# Patient Record
Sex: FEMALE | Race: WHITE | NOT HISPANIC OR LATINO | Employment: OTHER | URBAN - METROPOLITAN AREA
[De-identification: names, ages, dates, MRNs, and addresses within clinical notes are randomized per-mention and may not be internally consistent; named-entity substitution may affect disease eponyms.]

---

## 2017-01-17 ENCOUNTER — OFFICE VISIT (OUTPATIENT)
Dept: URGENT CARE | Facility: CLINIC | Age: 42
End: 2017-01-17
Payer: COMMERCIAL

## 2017-01-17 VITALS
DIASTOLIC BLOOD PRESSURE: 64 MMHG | TEMPERATURE: 98.4 F | SYSTOLIC BLOOD PRESSURE: 118 MMHG | BODY MASS INDEX: 23.99 KG/M2 | HEART RATE: 60 BPM | HEIGHT: 65 IN | OXYGEN SATURATION: 95 % | WEIGHT: 144 LBS | RESPIRATION RATE: 12 BRPM

## 2017-01-17 DIAGNOSIS — J02.0 STREP THROAT: ICD-10-CM

## 2017-01-17 LAB
INT CON NEG: NEGATIVE
INT CON POS: POSITIVE
S PYO AG THROAT QL: POSITIVE

## 2017-01-17 PROCEDURE — 99204 OFFICE O/P NEW MOD 45 MIN: CPT | Performed by: FAMILY MEDICINE

## 2017-01-17 PROCEDURE — 87880 STREP A ASSAY W/OPTIC: CPT | Performed by: FAMILY MEDICINE

## 2017-01-17 RX ORDER — AMOXICILLIN AND CLAVULANATE POTASSIUM 875; 125 MG/1; MG/1
1 TABLET, FILM COATED ORAL 2 TIMES DAILY
Qty: 20 TAB | Refills: 0 | Status: SHIPPED | OUTPATIENT
Start: 2017-01-17 | End: 2017-01-27

## 2017-01-17 RX ORDER — HYDROCODONE BITARTRATE AND ACETAMINOPHEN 5; 325 MG/1; MG/1
1 TABLET ORAL EVERY 8 HOURS PRN
Qty: 3 TAB | Refills: 0 | Status: SHIPPED | OUTPATIENT
Start: 2017-01-17 | End: 2021-12-02

## 2017-01-17 NOTE — Clinical Note
January 17, 2017         Patient: Chacha Aguilera   YOB: 1975   Date of Visit: 1/17/2017           To Whom it May Concern:    Chacha Aguilera was seen in my clinic on 1/17/2017. She may return to work on thursday.    If you have any questions or concerns, please don't hesitate to call.        Sincerely,           Robert Rodriguez M.D.  Electronically Signed

## 2017-01-17 NOTE — MR AVS SNAPSHOT
"        Chacha Aguilera   2017 3:15 PM   Office Visit   MRN: [id number]    Department:  [facility]   Dept Phone:  [phone]    Description:  Female : 1975   Provider:  Robert Rodriguez M.D.           Reason for Visit     Pharyngitis daughter had strep about a week ago       Allergies as of 2017     No Known Allergies      You were diagnosed with     Strep throat   [228998]         Vital Signs     Blood Pressure Pulse Temperature Respirations Height Weight    118/64 mmHg 60 36.9 °C (98.4 °F) 12 1.651 m (5' 5\") 65.318 kg (144 lb)    Body Mass Index Oxygen Saturation Breastfeeding? Smoking Status          23.96 kg/m2 95% No Never Smoker         Basic Information     Date Of Birth Sex Race Ethnicity Preferred Language    1975 Female White Non- English      Problem List              ICD-10-CM Priority Class Noted - Resolved    Migraine    2010 - Present      Health Maintenance        Date Due Completion Dates    IMM DTaP/Tdap/Td Vaccine (1 - Tdap) 1995 ---    PAP SMEAR 1997 ---    MAMMOGRAM 2016 ---    IMM INFLUENZA (1) 2016 ---            Results     POCT Rapid Strep A      Component    Rapid Strep Screen    positive    Internal Control Positive    Positive    Internal Control Negative    Negative                        Current Immunizations     No immunizations on file.      Below and/or attached are the medications your provider expects you to take. Review all of your home medications and newly ordered medications with your provider and/or pharmacist. Follow medication instructions as directed by your provider and/or pharmacist. Please keep your medication list with you and share with your provider. Update the information when medications are discontinued, doses are changed, or new medications (including over-the-counter products) are added; and carry medication information at all times in the event of emergency situations     Allergies:  No Known " Allergies          Medications  Valid as of: January 17, 2017 -  4:40 PM    Generic Name Brand Name Tablet Size Instructions for use    ALPRAZolam (Tab) XANAX 0.25 MG Take 1 Tab by mouth 3 times a day.        Amoxicillin-Pot Clavulanate (Tab) AUGMENTIN 875-125 MG Take 1 Tab by mouth 2 times a day for 10 days.        DiazePAM (Tab) VALIUM 5 MG Take 1 Tab by mouth every 6 hours as needed for Anxiety.        Estradiol Valerate   1.5 mg. Tuesday and Friday         Frovatriptan Succinate (Tab) Frovatriptan Succinate 2.5 MG Take 1 Tab by mouth. 1 tab, repeat in 2 hr if needed        Hydrocodone-Acetaminophen (Tab) NORCO 5-325 MG Take 1 Tab by mouth every 8 hours as needed.        Ketorolac Tromethamine (Solution) TORADOL 30 MG/ML 2 mL by Intramuscular route 1 time daily as needed.        Nalbuphine HCl (Solution) NUBAIN 10 MG/ML 2 mL by Intramuscular route 1 time daily as needed for 1 dose.        Oxycodone-Acetaminophen (Tab) PERCOCET  MG Take 1-2 Tabs by mouth every four hours as needed for Mild Pain.        Oxycodone-Acetaminophen (Tab) PERCOCET 5-325 MG Take 1-2 Tabs by mouth every 6 hours as needed for Mild Pain. for headache        Progesterone (Suppos) Progesterone 25 MG QHS.         Progesterone   QDAY.         Promethazine HCl (Tab) PHENERGAN 25 MG Take 1 Tab by mouth every 6 hours as needed for Nausea/Vomiting.        SUMAtriptan Succinate   Take  by mouth.        SUMAtriptan Succinate (Tab) IMITREX 50 MG Take 1 Tab by mouth Once PRN for Migraine. may repeat in 2 hrs if needed. for 1 dose.        TraZODone HCl   by Does not apply route.        .                 Medicines prescribed today were sent to:     [facility][id number] - [place], [place] - [address]    [address] [address]    Phone: [phone] Fax: [phone]    Open 24 Hours?: No      Medication refill instructions:       If your prescription bottle indicates you have medication refills left, it is not necessary to call your  provider’s office. Please contact your pharmacy and they will refill your medication.    If your prescription bottle indicates you do not have any refills left, you may request refills at any time through one of the following ways: The online Dragonfly system (except Urgent Care), by calling your provider’s office, or by asking your pharmacy to contact your provider’s office with a refill request. Medication refills are processed only during regular business hours and may not be available until the next business day. Your provider may request additional information or to have a follow-up visit with you prior to refilling your medication.   *Please Note: Medication refills are assigned a new Rx number when refilled electronically. Your pharmacy may indicate that no refills were authorized even though a new prescription for the same medication is available at the pharmacy. Please request the medicine by name with the pharmacy before contacting your provider for a refill.           Dragonfly Access Code: [email or web address]  Expires: 2/16/2017  4:40 PM    Your email address is not on file at [facility].  Email Addresses are required for you to sign up for Dragonfly, please contact [phone] to verify your personal information and to provide your email address prior to attempting to register for Dragonfly.    Chacha Aguilera  [address] [place] [address]    Dragonfly  A secure, online tool to manage your health information     [facility]’s Dragonfly® is a secure, online tool that connects you to your personalized health information from the privacy of your home -- day or night - making it very easy for you to manage your healthcare. Once the activation process is completed, you can even access your medical information using the Dragonfly dae, which is available for free in the Apple Dae store or Google Play store.     To learn more about Dragonfly, visit [email or web address]    There are two levels of access available  (as shown below):   My Chart Features  [facility]  Urgent  Care [facility]   Email your healthcare team securely and privately 24/7 X X X    Manage appointments: schedule your next appointment; view details of past/upcoming appointments X      Request prescription refills. X      View recent personal medical records, including lab and immunizations X X X X   View health record, including health history, allergies, medications X X X X   Read reports about your outpatient visits, procedures, consult and ER notes X X X X   See your discharge summary, which is a recap of your hospital and/or ER visit that includes your diagnosis, lab results, and care plan X X  X     How to register for [email or web address]:  Once your e-mail address has been verified, follow the following steps to sign up for [email or web address].     1. Go to  [email or web address]  2. Click on the Sign Up Now box, which takes you to the New Member Sign Up page. You will need to provide the following information:  a. Enter your HearToday.Org Access Code exactly as it appears at the top of this page. (You will not need to use this code after you’ve completed the sign-up process. If you do not sign up before the expiration date, you must request a new code.)   b. Enter your date of birth.   c. Enter your home email address.   d. Click Submit, and follow the next screen’s instructions.  3. Create a HearToday.Org ID. This will be your HearToday.Org login ID and cannot be changed, so think of one that is secure and easy to remember.  4. Create a HearToday.Org password. You can change your password at any time.  5. Enter your Password Reset Question and Answer. This can be used at a later time if you forget your password.   6. Enter your e-mail address. This allows you to receive e-mail notifications when new information is available in [email or web address].  7. Click Sign Up. You can now view your health information.    For assistance activating your  MyChart account, call [phone]

## 2017-01-18 NOTE — PROGRESS NOTES
Subjective:     CC:  presents with Pharyngitis            Pharyngitis   This is a new problem. The current episode started in the past 7 days. The problem has been gradually worsening. There has been no fever. The pain is moderate. Associated symptoms include a hoarse voice, swollen glands and trouble swallowing. Pertinent negatives include no abdominal pain, congestion, coughing, diarrhea, headaches, shortness of breath or vomiting. no exposure to strep or mono. He has tried acetaminophen for the symptoms. The treatment provided mild relief.     Social History   Substance Use Topics   • Smoking status: Never Smoker    • Smokeless tobacco: Not on file   • Alcohol Use: No     Current Outpatient Prescriptions on File Prior to Visit   Medication Sig Dispense Refill   • alprazolam (XANAX) 0.25 MG TABS Take 1 Tab by mouth 3 times a day. 30 Each 1   • promethazine (PHENERGAN) 25 MG TABS Take 1 Tab by mouth every 6 hours as needed for Nausea/Vomiting. 20 Each 0   • diazepam (VALIUM) 5 MG TABS Take 1 Tab by mouth every 6 hours as needed for Anxiety. 30 Each 0   • oxycodone-acetaminophen (PERCOCET) 5-325 MG TABS Take 1-2 Tabs by mouth every 6 hours as needed for Mild Pain. for headache 30 Each 0   • Frovatriptan Succinate 2.5 MG TABS Take 1 Tab by mouth. 1 tab, repeat in 2 hr if needed 10 Tab 3   • nalbuphine (NUBAIN) 10 MG/ML SOLN 2 mL by Intramuscular route 1 time daily as needed for 1 dose. 2 mL 0   • ketorolac (TORADOL) 30 MG/ML SOLN 2 mL by Intramuscular route 1 time daily as needed. 2 mL 0   • PERCOCET  MG PO TABS Take 1-2 Tabs by mouth every four hours as needed for Mild Pain.     • IMITREX PO Take  by mouth.     • TRAZODONE HCL by Does not apply route.     • SUMATRIPTAN SUCCINATE 50 MG PO TABS Take 1 Tab by mouth Once PRN for Migraine. may repeat in 2 hrs if needed. for 1 dose. 10 Each 3   • PROGESTERONE 25 MG VA SUPP QHS.      • PROGESTERONE IM QDAY.      • ESTRADIOL VALERATE 1.5 mg. Tuesday and Friday     "    No current facility-administered medications on file prior to visit.     Family history was reviewed and not pertinent to current problem.     Past Medical History   Diagnosis Date   • Migraines, Neuralgic        Review of Systems   Constitutional: Positive for malaise/fatigue. Negative for fever and weight loss.   HENT: Positive for hoarse voice and trouble swallowing. Negative for congestion.    Respiratory: Negative for cough, sputum production and shortness of breath.    Cardiovascular: Negative for chest pain.   Gastrointestinal: Negative for nausea, vomiting, abdominal pain and diarrhea.   Genitourinary: Negative.    Neurological: Negative for dizziness and headaches.   All other systems reviewed and are negative.         Objective:     Blood pressure 118/64, pulse 60, temperature 36.9 °C (98.4 °F), resp. rate 12, height 1.651 m (5' 5\"), weight 65.318 kg (144 lb), SpO2 95 %, not currently breastfeeding.      Physical Exam   Constitutional:   oriented to person, place, and time.  appears well-developed and well-nourished. No distress.   HENT:   Head: Normocephalic and atraumatic.   Right Ear: External ear normal.   Left Ear: External ear normal.   Nose: Mucosal edema present. Right sinus exhibits no maxillary sinus tenderness and no frontal sinus tenderness. Left sinus exhibits no maxillary sinus tenderness and no frontal sinus tenderness.   Mouth/Throat: Oropharyngeal exudate and posterior oropharyngeal erythema present. No posterior oropharyngeal edema.   Tonsils 2+ bilaterally     Eyes: Conjunctivae and EOM are normal. Pupils are equal, round, and reactive to light. Right eye exhibits no discharge. Left eye exhibits no discharge. No scleral icterus.   Neck: Normal range of motion. Neck supple. No JVD present. No tracheal deviation present. No thyromegaly present.   Cardiovascular: Normal rate, regular rhythm, normal heart sounds and intact distal pulses.  Exam reveals no friction rub.    No murmur " heard.  Pulmonary/Chest: Effort normal and breath sounds normal. No respiratory distress.   no wheezes.   no rales.    Musculoskeletal:  exhibits no edema.   Lymphadenopathy:     Positive Cervical adenopathy.   Neurological:   alert and oriented to person, place, and time.   Skin: Skin is warm and dry. No erythema.   Psychiatric:   normal mood and affect.   Nursing note and vitals reviewed.             Assessment/Plan:     1. Tonsillitis   rapid strep positive  - amoxicillin-clavulanate (AUGMENTIN) 875-125 MG Tab; Take 1 Tab by mouth 2 times a day for 10 days.  Dispense: 20 Tab; Refill: 0

## 2018-02-08 ENCOUNTER — OFFICE VISIT (OUTPATIENT)
Dept: URGENT CARE | Facility: CLINIC | Age: 43
End: 2018-02-08
Payer: COMMERCIAL

## 2018-02-08 VITALS
HEART RATE: 73 BPM | HEIGHT: 65 IN | OXYGEN SATURATION: 99 % | WEIGHT: 150.4 LBS | SYSTOLIC BLOOD PRESSURE: 100 MMHG | RESPIRATION RATE: 16 BRPM | DIASTOLIC BLOOD PRESSURE: 74 MMHG | TEMPERATURE: 97.8 F | BODY MASS INDEX: 25.06 KG/M2

## 2018-02-08 DIAGNOSIS — J11.1 INFLUENZA-LIKE ILLNESS: ICD-10-CM

## 2018-02-08 LAB
FLUAV+FLUBV AG SPEC QL IA: NEGATIVE
INT CON NEG: NORMAL
INT CON POS: NORMAL

## 2018-02-08 PROCEDURE — 87804 INFLUENZA ASSAY W/OPTIC: CPT | Performed by: PHYSICIAN ASSISTANT

## 2018-02-08 PROCEDURE — 99214 OFFICE O/P EST MOD 30 MIN: CPT | Performed by: PHYSICIAN ASSISTANT

## 2018-02-08 RX ORDER — OSELTAMIVIR PHOSPHATE 75 MG/1
75 CAPSULE ORAL 2 TIMES DAILY
Qty: 10 CAP | Refills: 0 | Status: SHIPPED | OUTPATIENT
Start: 2018-02-08 | End: 2018-02-13

## 2018-02-08 ASSESSMENT — ENCOUNTER SYMPTOMS
SINUS PAIN: 0
COUGH: 0
HEADACHES: 1
PALPITATIONS: 0
VOMITING: 0
NAUSEA: 1
CHILLS: 0
ABDOMINAL PAIN: 0
SORE THROAT: 1
TINGLING: 0
SHORTNESS OF BREATH: 0
MYALGIAS: 1
SPUTUM PRODUCTION: 0
FOCAL WEAKNESS: 0
FEVER: 1
WHEEZING: 0
DIARRHEA: 0
SENSORY CHANGE: 0

## 2018-02-09 NOTE — PROGRESS NOTES
"Subjective:      Chacha Aguilera is a 42 y.o. female who presents with Flu Like Symptoms (fever, chills and body aches, x1day)            Fever    This is a new problem. The current episode started yesterday. The problem occurs constantly. The maximum temperature noted was 100 to 100.9 F. Associated symptoms include headaches, muscle aches, nausea and a sore throat. Pertinent negatives include no abdominal pain, chest pain, congestion, coughing, diarrhea, ear pain, rash, urinary pain, vomiting or wheezing. She has tried nothing for the symptoms.     Daughter was treated for influenza.    Past Medical History:   Diagnosis Date   • Migraines, neuralgic        No past surgical history on file.    No family history on file.    No Known Allergies    Medications, Allergies, and current problem list reviewed today in Epic      Review of Systems   Constitutional: Positive for fever. Negative for chills and malaise/fatigue.   HENT: Positive for sore throat. Negative for congestion, ear discharge, ear pain and sinus pain.    Respiratory: Negative for cough, sputum production, shortness of breath and wheezing.    Cardiovascular: Negative for chest pain, palpitations and leg swelling.   Gastrointestinal: Positive for nausea. Negative for abdominal pain, diarrhea and vomiting.   Genitourinary: Negative for dysuria.   Musculoskeletal: Positive for myalgias.   Skin: Negative for rash.   Neurological: Positive for headaches. Negative for tingling, sensory change and focal weakness.     All other systems reviewed and are negative.        Objective:     /74   Pulse 73   Temp 36.6 °C (97.8 °F)   Resp 16   Ht 1.651 m (5' 5\")   Wt 68.2 kg (150 lb 6.4 oz)   SpO2 99%   BMI 25.03 kg/m²      Physical Exam   Constitutional: She is oriented to person, place, and time. She appears well-developed and well-nourished. No distress.   HENT:   Head: Normocephalic and atraumatic.   Right Ear: Tympanic membrane, external ear and ear canal " normal.   Left Ear: Tympanic membrane, external ear and ear canal normal.   Nose: Mucosal edema and rhinorrhea present.   Mouth/Throat: Uvula is midline and mucous membranes are normal. Posterior oropharyngeal erythema present.   Neck: Neck supple.   Cardiovascular: Normal rate, regular rhythm and normal heart sounds.  Exam reveals no gallop and no friction rub.    No murmur heard.  Pulmonary/Chest: Effort normal. No respiratory distress. She has no wheezes. She has no rales.   Lymphadenopathy:     She has no cervical adenopathy.   Neurological: She is alert and oriented to person, place, and time. No cranial nerve deficit.   Skin: Skin is warm and dry. No rash noted.   Psychiatric: She has a normal mood and affect. Her behavior is normal. Judgment and thought content normal.               Assessment/Plan:     1. Influenza-like illness  oseltamivir (TAMIFLU) 75 MG Cap         Current Outpatient Prescriptions:   •  oseltamivir (TAMIFLU) 75 MG Cap, Take 1 Cap by mouth 2 times a day for 5 days., Disp: 10 Cap, Rfl: 0       Differential diagnoses, Supportive care, and indications for immediate follow-up discussed with patient.   Instructed to return to clinic or nearest emergency department for any change in condition, further concerns, or worsening of symptoms.    The patient demonstrated a good understanding and agreed with the treatment plan.    Padmaja Smith P.A.-C.

## 2021-07-07 ENCOUNTER — GYNECOLOGY VISIT (OUTPATIENT)
Dept: OBGYN | Facility: CLINIC | Age: 46
End: 2021-07-07
Payer: MEDICAID

## 2021-07-07 VITALS
DIASTOLIC BLOOD PRESSURE: 84 MMHG | BODY MASS INDEX: 25.25 KG/M2 | HEIGHT: 66 IN | WEIGHT: 157.1 LBS | SYSTOLIC BLOOD PRESSURE: 120 MMHG

## 2021-07-07 DIAGNOSIS — N93.8 DUB (DYSFUNCTIONAL UTERINE BLEEDING): ICD-10-CM

## 2021-07-07 LAB
INT CON NEG: NEGATIVE
INT CON POS: POSITIVE
POC URINE PREGNANCY TEST: POSITIVE

## 2021-07-07 PROCEDURE — 76830 TRANSVAGINAL US NON-OB: CPT | Performed by: OBSTETRICS & GYNECOLOGY

## 2021-07-07 PROCEDURE — 99203 OFFICE O/P NEW LOW 30 MIN: CPT | Mod: 25 | Performed by: OBSTETRICS & GYNECOLOGY

## 2021-07-07 PROCEDURE — 81025 URINE PREGNANCY TEST: CPT | Performed by: OBSTETRICS & GYNECOLOGY

## 2021-07-07 RX ORDER — DROSPIRENONE AND ETHINYL ESTRADIOL 0.03MG-3MG
1 KIT ORAL DAILY
Qty: 28 TABLET | Refills: 11 | Status: SHIPPED | OUTPATIENT
Start: 2021-07-07 | End: 2022-07-23

## 2021-07-07 RX ORDER — NORGESTIMATE AND ETHINYL ESTRADIOL 0.25-0.035
1 KIT ORAL DAILY
Qty: 28 TABLET | Refills: 11 | Status: SHIPPED | OUTPATIENT
Start: 2021-07-07 | End: 2021-12-02

## 2021-07-07 NOTE — PROGRESS NOTES
"CC: Amenorrhea    Chacha Aguilera,  46 y.o.  female with Patient's last menstrual period was 2021 (exact date). presents today with complaint of dysfunctional uterine bleeding.    Subjective : Patient presents to the office for absent menses.    3 days ago started bleeding, is concerned for miscarriage.      Pertinent positives documented in HPI and all other systems reviewed & are negative    OB History    Para Term  AB Living   2 2 2     2   SAB TAB Ectopic Molar Multiple Live Births             2      # Outcome Date GA Lbr Markie/2nd Weight Sex Delivery Anes PTL Lv   2 Term 08 37w0d  3.118 kg (6 lb 14 oz) M Vag-Spont EPI N DAVION      Birth Comments: Pt states invitro pregnancy    1 Term 10/05/95 41w0d  3.572 kg (7 lb 14 oz) F Vag-Spont None N DAVION      Birth Comments: Pt states no complications.        Past Gyn history: last pap few years ago, hx STDs denies, has not had a mammogram    Past Medical History:   Diagnosis Date   • Head ache    • Migraines, neuralgic        History reviewed. No pertinent surgical history.    Meds: P[place]    Allergies: Patient has no known allergies.    Physical Exam:    /84   Ht 1.676 m (5' 6\")   Wt 71.3 kg (157 lb 1.6 oz)   LMP 2021 (Exact Date)   BMI 25.36 kg/m²   Gen: well-appearing, well-hydrated, well-nourished  Abd: abdomen is soft without significant tenderness, masses, organomegaly or guarding  Pelvic: External genitalia normal, Urethra without abnormality or discharge, no CMT  Ext: NT bilaterally, no cyanosis, clubbing or edema    Recent Results (from the past 336 hour(s))   POCT Pregnancy    Collection Time: 21  2:33 PM   Result Value Ref Range    POC Urine Pregnancy Test Positive Negative    Internal Control Positive Positive     Internal Control Negative Negative        Transvaginal US performed and per my read:    Indication: Amenorrhea    Findings: Endometrial stripe is 0.99cm, no gestational sac no fetal pole, no signs " of retained products of conception.  Bilateral adnexa normal size, no abornmal cysts noted.      Impression: Complete .       Assessment:  46 y.o. with complete     Plan:  Discussed miscarriage causes and likelihood in patients age group; this was not a desired pregnancy, but one her and her partner would welcome.  Offered support and gave her fertility options, but the patient would like to proceed with birth control.  Prefers Narcisa, but medicaid may not cover it.  Is a  and normally has private insurance, since covid has been on medicaid.   Will send Rx for sprintec and give paper Rx for Narcisa for her to research out of pocket costs  SAB/labor precautions educated  Call office w/ questions or concerns

## 2021-07-07 NOTE — NON-PROVIDER
Patient here for GYN/DUB  LMP: 4/12/2021  BRIAN: 1/17/2022  GA: 12w2d  Last pap: 2019 WNL  # [phone]  Pharmacy verified   Pt states having nausea and has had blood clots on 7/3/2021.

## 2021-07-13 ENCOUNTER — TELEPHONE (OUTPATIENT)
Dept: OBGYN | Facility: CLINIC | Age: 46
End: 2021-07-13

## 2021-07-13 NOTE — TELEPHONE ENCOUNTER
Patient called stating that her pharmacy has not received the prescription for her Annette birth control. Let her know that the prescription printed out as a paper prescritpion that is why the pharmacy never received it. Patient was at her pharmacy waiting. Let patient know that I would call the phamacy and place her prescription verbally if they allowed me to do so

## 2021-12-02 ENCOUNTER — OFFICE VISIT (OUTPATIENT)
Dept: URGENT CARE | Facility: CLINIC | Age: 46
End: 2021-12-02
Payer: MEDICAID

## 2021-12-02 VITALS
SYSTOLIC BLOOD PRESSURE: 142 MMHG | HEIGHT: 66 IN | TEMPERATURE: 97.6 F | DIASTOLIC BLOOD PRESSURE: 90 MMHG | HEART RATE: 97 BPM | BODY MASS INDEX: 25.39 KG/M2 | OXYGEN SATURATION: 95 % | WEIGHT: 158 LBS | RESPIRATION RATE: 20 BRPM

## 2021-12-02 DIAGNOSIS — Z76.0 MEDICATION REFILL: ICD-10-CM

## 2021-12-02 DIAGNOSIS — F32.A DEPRESSION, UNSPECIFIED DEPRESSION TYPE: ICD-10-CM

## 2021-12-02 PROCEDURE — 99213 OFFICE O/P EST LOW 20 MIN: CPT | Performed by: NURSE PRACTITIONER

## 2021-12-02 RX ORDER — BUPROPION HYDROCHLORIDE 150 MG/1
150 TABLET ORAL EVERY MORNING
COMMUNITY
End: 2021-12-02 | Stop reason: SDUPTHER

## 2021-12-02 RX ORDER — BUPROPION HYDROCHLORIDE 150 MG/1
150 TABLET ORAL EVERY MORNING
Qty: 30 TABLET | Refills: 1 | Status: SHIPPED | OUTPATIENT
Start: 2021-12-02 | End: 2022-01-25

## 2021-12-02 ASSESSMENT — ENCOUNTER SYMPTOMS
EYE PAIN: 0
CHILLS: 0
INSOMNIA: 0
DIZZINESS: 0
NAUSEA: 0
SHORTNESS OF BREATH: 0
NERVOUS/ANXIOUS: 0
SORE THROAT: 0
MYALGIAS: 0
FEVER: 0
DEPRESSION: 1
VOMITING: 0

## 2021-12-02 ASSESSMENT — LIFESTYLE VARIABLES: SUBSTANCE_ABUSE: 0

## 2021-12-03 NOTE — PROGRESS NOTES
Subjective:   Chacha Aguilera is a 46 y.o. female who presents for Medication Refill (medication refill, Wellbutrin)      HPI  Patient is a 46-year-old female presents the urgent care with request of a medication refill of Wellbutrin which she has been taking for depression.  Patient states that she has been on the medication for 28 days as she saw a doctor via  service.  She attempted to follow-up with them however was not able to restart office.  She is concerned as she only has 2 tablets left and feels that she is going to run out before the weekend.  She notes an improvement of her mood on the current medication.  Has no suicidal or homicidal ideation.  Does have an appointment scheduled for January 17 to establish care with a new PCP  Review of Systems   Constitutional: Negative for chills and fever.   HENT: Negative for sore throat.    Eyes: Negative for pain.   Respiratory: Negative for shortness of breath.    Cardiovascular: Negative for chest pain.   Gastrointestinal: Negative for nausea and vomiting.   Genitourinary: Negative for hematuria.   Musculoskeletal: Negative for myalgias.   Skin: Negative for rash.   Neurological: Negative for dizziness.   Psychiatric/Behavioral: Positive for depression. Negative for substance abuse and suicidal ideas. The patient is not nervous/anxious and does not have insomnia.        Medications:    • buPROPion  • drospirenone-ethinyl estradiol    Allergies: Patient has no known allergies.    Problem List: Chacha Aguilera does not have any pertinent problems on file.    Surgical History:  No past surgical history on file.    Past Social Hx: Chacha Aguilera  reports that she has quit smoking. Her smoking use included cigarettes. She has never used smokeless tobacco. She reports that she does not drink alcohol and does not use drugs.     Past Family Hx:  Chacha Aguilera family history includes Diabetes in her mother; No Known Problems in her brother,  "father, maternal grandfather, and maternal grandmother.     Problem list, medications, and allergies reviewed by myself today in Epic.     Objective:     /90 (BP Location: Left arm, Patient Position: Sitting, BP Cuff Size: Adult long)   Pulse 97   Temp 36.4 °C (97.6 °F) (Temporal)   Resp 20   Ht 1.676 m (5' 6\")   Wt 71.7 kg (158 lb)   SpO2 95%   BMI 25.50 kg/m²     Physical Exam  Constitutional:       Appearance: Normal appearance. She is not ill-appearing or toxic-appearing.   HENT:      Head: Normocephalic.      Right Ear: External ear normal.      Left Ear: External ear normal.      Nose: Nose normal.      Mouth/Throat:      Lips: Pink.      Mouth: Mucous membranes are moist.   Eyes:      General: Lids are normal.         Right eye: No discharge.         Left eye: No discharge.   Pulmonary:      Effort: Pulmonary effort is normal. No accessory muscle usage or respiratory distress.   Musculoskeletal:         General: Normal range of motion.      Cervical back: Full passive range of motion without pain.   Skin:     Coloration: Skin is not pale.   Neurological:      Mental Status: She is alert and oriented to person, place, and time.   Psychiatric:         Attention and Perception: Attention normal.         Mood and Affect: Mood normal.         Speech: Speech normal.         Behavior: Behavior normal.         Thought Content: Thought content normal.         Cognition and Memory: Cognition and memory normal.         Assessment/Plan:     Diagnosis and associated orders:     1. Depression, unspecified depression type  buPROPion (WELLBUTRIN XL) 150 MG XL tablet   2. Medication refill        Comments/MDM:     I personally reviewed prior external notes and prior test results pertinent to today's visit.   Discussed management options, risks and benefits, and alternatives to treatment plan agreed upon.   Red flags discussed and indications to immediately call 911 or present to the Emergency Department. "   Supportive care, differential diagnoses, and indications for immediate follow-up discussed with patient.    • Patient expresses understanding and agrees to plan. Patient denies any other questions or concerns.   •              Please note that this dictation was created using voice recognition software. I have made a reasonable attempt to correct obvious errors, but I expect that there are errors of grammar and possibly content that I did not discover before finalizing the note.    This note was electronically signed by Sam CALDWELL.

## 2022-01-25 ENCOUNTER — OFFICE VISIT (OUTPATIENT)
Dept: URGENT CARE | Facility: CLINIC | Age: 47
End: 2022-01-25
Payer: MEDICAID

## 2022-01-25 VITALS
DIASTOLIC BLOOD PRESSURE: 80 MMHG | RESPIRATION RATE: 16 BRPM | OXYGEN SATURATION: 97 % | BODY MASS INDEX: 26.73 KG/M2 | TEMPERATURE: 98 F | SYSTOLIC BLOOD PRESSURE: 122 MMHG | WEIGHT: 160.4 LBS | HEART RATE: 62 BPM | HEIGHT: 65 IN

## 2022-01-25 DIAGNOSIS — Z76.0 MEDICATION REFILL: ICD-10-CM

## 2022-01-25 PROCEDURE — 99212 OFFICE O/P EST SF 10 MIN: CPT | Performed by: PHYSICIAN ASSISTANT

## 2022-01-25 RX ORDER — BUPROPION HYDROCHLORIDE 150 MG/1
150 TABLET ORAL EVERY MORNING
Qty: 30 TABLET | Refills: 0 | Status: SHIPPED | OUTPATIENT
Start: 2022-01-25 | End: 2022-07-23

## 2022-01-25 NOTE — PROGRESS NOTES
"Subjective:   Chacha Aguilera is a 46 y.o. female who presents for Medication Refill (Wellbutrin )      HPI  Patient is here for medication refill.  She is requesting Wellbutrin 150 mg daily.  Her last refill was here in the urgent care.  She did establish with primary care and had an appointment last week but her PCP canceled the appointment.  She does have another scheduled appointment with PCP in 2 weeks.  She runs out of her Wellbutrin today.              Medications:    • buPROPion  • drospirenone-ethinyl estradiol    Allergies: Patient has no known allergies.    Problem List: Chacha Aguilera does not have any pertinent problems on file.    Surgical History:  No past surgical history on file.    Past Social Hx: Chacha Aguilera  reports that she has quit smoking. Her smoking use included cigarettes. She has never used smokeless tobacco. She reports that she does not drink alcohol and does not use drugs.     Past Family Hx:  Chacha Aguilera family history includes Diabetes in her mother; No Known Problems in her brother, father, maternal grandfather, and maternal grandmother.     Problem list, medications, and allergies reviewed by myself today in Epic.     Objective:     /80   Pulse 62   Temp 36.7 °C (98 °F)   Resp 16   Ht 1.651 m (5' 5\")   Wt 72.8 kg (160 lb 6.4 oz)   SpO2 97%   BMI 26.69 kg/m²     Physical Exam  Vitals reviewed.   Constitutional:       General: She is not in acute distress.     Appearance: Normal appearance. She is not ill-appearing or toxic-appearing.   Eyes:      Conjunctiva/sclera: Conjunctivae normal.      Pupils: Pupils are equal, round, and reactive to light.   Cardiovascular:      Rate and Rhythm: Normal rate.   Pulmonary:      Effort: Pulmonary effort is normal.   Musculoskeletal:      Cervical back: Neck supple.   Skin:     General: Skin is warm and dry.   Neurological:      General: No focal deficit present.      Mental Status: She is alert and oriented to " person, place, and time.   Psychiatric:         Mood and Affect: Mood normal.         Behavior: Behavior normal.         Diagnosis and associated orders:     1. Medication refill  buPROPion (WELLBUTRIN XL) 150 MG XL tablet        Comments/MDM:     • Medication refilled  • Follow-up with PCP         Please note that this dictation was created using voice recognition software. I have made a reasonable attempt to correct obvious errors, but I expect that there are errors of grammar and possibly content that I did not discover before finalizing the note.    This note was electronically signed by Reza Foreman PA-C

## 2022-07-23 ENCOUNTER — OFFICE VISIT (OUTPATIENT)
Dept: URGENT CARE | Facility: CLINIC | Age: 47
End: 2022-07-23
Payer: MEDICAID

## 2022-07-23 ENCOUNTER — APPOINTMENT (OUTPATIENT)
Dept: RADIOLOGY | Facility: IMAGING CENTER | Age: 47
End: 2022-07-23
Payer: MEDICAID

## 2022-07-23 VITALS
DIASTOLIC BLOOD PRESSURE: 80 MMHG | SYSTOLIC BLOOD PRESSURE: 116 MMHG | OXYGEN SATURATION: 99 % | TEMPERATURE: 98.2 F | RESPIRATION RATE: 16 BRPM | HEART RATE: 86 BPM | WEIGHT: 138 LBS | HEIGHT: 66 IN | BODY MASS INDEX: 22.18 KG/M2

## 2022-07-23 DIAGNOSIS — S89.92XD INJURY OF LEFT KNEE, SUBSEQUENT ENCOUNTER: ICD-10-CM

## 2022-07-23 PROCEDURE — 73562 X-RAY EXAM OF KNEE 3: CPT | Mod: TC,LT

## 2022-07-23 PROCEDURE — 99213 OFFICE O/P EST LOW 20 MIN: CPT

## 2022-07-23 NOTE — PROGRESS NOTES
Subjective:   Chacha Aguilera is a 47 y.o. female who presents for Leg Injury (L leg injury 5 weeks ago)      HPI:  Patient presents with complaints of left leg injury, fell into manhole approximately 5 weeks ago. Was initially seen by [facility], imaging negative for fractures and dislocations.   Diagnosed with contusions from blunt force trauma and abrasions.     Patient is concerned that she is parenting delayed injury healing of her left knee and lower leg.  Patient reports numbness and tingling sensation of the left medial knee, left lateral lower leg on left medial lower leg. Also having numbness in her left midlateral thigh.  Patient is also reporting new onset of chills and night sweats started a week or two ago.  She is also experiencing nausea, fatigue. Endorses decreased appetite, with adequate liquid intake. Patient denies known aggravating or alleviating factors.     Patient denies subsequent injuries to left leg. Denies head injury, loss of consciousness.  Patient denies personal medical history of cancer, back surgeries, epidurals.       ROS as above per HPI    Medications:    No current outpatient medications on file prior to visit.     No current facility-administered medications on file prior to visit.        Allergies:   Patient has no known allergies.    Problem List:   Patient Active Problem List   Diagnosis   • Migraine        Surgical History:  No past surgical history on file.    Past Social Hx:   Social History     Tobacco Use   • Smoking status: Current Some Day Smoker     Types: Cigarettes   • Smokeless tobacco: Never Used   • Tobacco comment: 3 cig a week quit with pregnancy    Vaping Use   • Vaping Use: Never used   Substance Use Topics   • Alcohol use: No   • Drug use: No          Problem list, medications, and allergies reviewed by myself today in Epic.     Objective:     /80 (BP Location: Right arm, Patient Position: Sitting, BP Cuff Size: Adult long)   Pulse 86   Temp  "36.8 °C (98.2 °F) (Temporal)   Resp 16   Ht 1.676 m (5' 6\")   Wt 62.6 kg (138 lb)   SpO2 99%   BMI 22.27 kg/m²     Physical Exam  Vitals and nursing note reviewed.   Constitutional:       Appearance: Normal appearance.   HENT:      Head: Normocephalic and atraumatic.   Eyes:      Conjunctiva/sclera: Conjunctivae normal.      Pupils: Pupils are equal, round, and reactive to light.   Cardiovascular:      Rate and Rhythm: Normal rate and regular rhythm.      Pulses: Normal pulses.      Heart sounds: Normal heart sounds.   Pulmonary:      Effort: Pulmonary effort is normal.      Breath sounds: Normal breath sounds.   Abdominal:      General: Bowel sounds are normal.      Palpations: Abdomen is soft.   Musculoskeletal:         General: Swelling, tenderness and signs of injury present.      Right hip: Normal.      Left hip: Normal.      Left upper leg: Swelling and tenderness present. No bony tenderness.      Right knee: Normal.      Left knee: Swelling and ecchymosis present. No deformity, effusion, erythema, lacerations, bony tenderness or crepitus. Normal range of motion. Tenderness present over the medial joint line. Normal alignment and normal patellar mobility. Normal pulse.      Instability Tests: Anterior drawer test negative. Posterior drawer test negative.      Right lower leg: Normal. No edema.      Left lower leg: Normal. No edema.      Right foot: Normal.      Left foot: Normal.        Legs:       Comments: There is tenderness to the left medial joint line upon palpation. Left medial knee has localized ecchymosis consistent with contusion. No tenderness to left hip and ankle. Full and active range of motion of all joints in the left lower extremity. Distal sensation is intact to light and sharp touch, cap refill less than 2 seconds, 2+ DP pulse.      There is a vertical abrasion noted on the left midlateral thigh that is TTP. There is a circular area of induration on the proximal portion of the abrasion. " The wound margins are well approximated. Skin is warm, dry, free of erythema.    Skin:     Capillary Refill: Capillary refill takes less than 2 seconds.      Findings: Bruising present.   Neurological:      Mental Status: She is alert and oriented to person, place, and time.      Sensory: No sensory deficit.      Motor: No weakness.      Gait: Gait normal.      Deep Tendon Reflexes: Reflexes normal.       DX-KNEE 3 VIEWS LEFT 07/23/2022    Narrative  7/23/2022 2:02 PM    HISTORY/REASON FOR EXAM:  Pain/Deformity Following Trauma; medial knee edema, tenderness.  Ground-level fall. Knee edema    TECHNIQUE/EXAM DESCRIPTION AND NUMBER OF VIEWS:  3 views of the LEFT knee.    COMPARISON: None    FINDINGS:  No acute fracture or dislocation.    No joint osteoarthritis    No knee joint effusion.    Impression  1. No acute osseous abnormality.    Assessment/Plan:     Diagnosis and associated orders:   1. Injury of left knee, subsequent encounter  - CBC WITH DIFFERENTIAL; Future  - Comp Metabolic Panel; Future  - Sed Rate; Future  - DX-KNEE 3 VIEWS LEFT        Comments/MDM:     Patient is a pleasant 47-year-old female who presents with concerns of delayed and wound healing following fall into a manhole approximately 5 weeks ago.  Relevant physical examination findings are consistent with contusion of the left knee.  Repeat imaging obtained today of left knee is also free of fractures and dislocation.  We will obtain lab work due to reported chills and night sweats.  Will notify patient results when available.  Red flag symptoms discussed with patient for her to return to the ER.        Pt is clinically stable at today's acute urgent care visit.  No acute distress noted. Appropriate for outpatient management at this time.       • Discussed DDx, management options (risks,benefits, and alternatives to planned treatment), natural progression and supportive care.  Expressed understanding and the treatment plan was agreed upon.  Questions were encouraged and answered   • Return to urgent care prn if new or worsening sx or if there is no improvement in condition prn.    • Educated in Red flags and indications to immediately call 911 or present to the Emergency Department.   • Advised the patient to follow-up with the primary care physician for recheck, reevaluation, and consideration of further management.      Please note that this dictation was created using voice recognition software. I have made a reasonable attempt to correct obvious errors, but I expect that there are errors of grammar and possibly content that I did not discover before finalizing the note.    This note was electronically signed by María Gibbs, CHERIE

## 2023-01-17 ENCOUNTER — OFFICE VISIT (OUTPATIENT)
Dept: URGENT CARE | Facility: CLINIC | Age: 48
End: 2023-01-17
Payer: MEDICAID

## 2023-01-17 VITALS
HEART RATE: 68 BPM | OXYGEN SATURATION: 97 % | DIASTOLIC BLOOD PRESSURE: 70 MMHG | WEIGHT: 138 LBS | HEIGHT: 65 IN | BODY MASS INDEX: 22.99 KG/M2 | RESPIRATION RATE: 14 BRPM | SYSTOLIC BLOOD PRESSURE: 118 MMHG | TEMPERATURE: 98.8 F

## 2023-01-17 DIAGNOSIS — F32.A DEPRESSION, UNSPECIFIED DEPRESSION TYPE: ICD-10-CM

## 2023-01-17 DIAGNOSIS — Z76.0 MEDICATION REFILL: ICD-10-CM

## 2023-01-17 PROCEDURE — 99213 OFFICE O/P EST LOW 20 MIN: CPT | Performed by: PHYSICIAN ASSISTANT

## 2023-01-17 RX ORDER — BUPROPION HYDROCHLORIDE 150 MG/1
150 TABLET ORAL DAILY
Qty: 60 TABLET | Refills: 0 | Status: SHIPPED | OUTPATIENT
Start: 2023-01-17 | End: 2024-02-12

## 2023-01-17 ASSESSMENT — ENCOUNTER SYMPTOMS
VOMITING: 0
DEPRESSION: 1
CHILLS: 0
FEVER: 0
NAUSEA: 0

## 2023-01-18 NOTE — PROGRESS NOTES
"Subjective:   Chacha Aguilera  is a 47 y.o. female who presents for Medication Refill (Wellbutrin out 24hours)      Other  This is a new problem. Pertinent negatives include no chills, fever, nausea or vomiting.     Patient resents urgent care requesting refill of her antidepressant Wellbutrin.  Patient's been on this medication for a number of years.  Has had good control of depression.  She has been out of medication for just over 24 hours and is feeling some return of her symptoms of depression.  Patient had a cancellation from her prior primary care and rescheduled with another 1 in just over 2 weeks.  She requests a refill of medication to bridge her through that timeframe.  She denies SI/HI.    Review of Systems   Constitutional:  Negative for chills and fever.   Gastrointestinal:  Negative for nausea and vomiting.   Psychiatric/Behavioral:  Positive for depression. Negative for suicidal ideas.      No Known Allergies     Objective:   /70   Pulse 68   Temp 37.1 °C (98.8 °F) (Temporal)   Resp 14   Ht 1.651 m (5' 5\")   Wt 62.6 kg (138 lb)   LMP 01/03/2023 (Approximate)   SpO2 97%   Breastfeeding No   BMI 22.96 kg/m²     Physical Exam  Vitals and nursing note reviewed.   Constitutional:       General: She is not in acute distress.     Appearance: She is well-developed. She is not diaphoretic.   HENT:      Head: Normocephalic and atraumatic.      Right Ear: External ear normal.      Left Ear: External ear normal.      Nose: Nose normal.   Eyes:      General: Lids are normal. No scleral icterus.        Right eye: No discharge.         Left eye: No discharge.      Conjunctiva/sclera: Conjunctivae normal.   Pulmonary:      Effort: Pulmonary effort is normal. No respiratory distress.   Musculoskeletal:         General: Normal range of motion.      Cervical back: Neck supple.   Skin:     General: Skin is warm and dry.      Coloration: Skin is not pale.      Findings: No erythema.   Neurological:      " Mental Status: She is alert and oriented to person, place, and time. She is not disoriented.   Psychiatric:         Speech: Speech normal.         Behavior: Behavior normal.       Assessment/Plan:   1. Medication refill  - buPROPion (WELLBUTRIN XL) 150 MG XL tablet; Take 1 Tablet by mouth every day.  Dispense: 60 Tablet; Refill: 0    2. Depression, unspecified depression type  - buPROPion (WELLBUTRIN XL) 150 MG XL tablet; Take 1 Tablet by mouth every day.  Dispense: 60 Tablet; Refill: 0    Stressed the importance of follow-up with PCP.  Return to clinic with lack of resolution or progression of symptoms.  ER precautions with any worsening symptoms are reviewed with patient/caregiver and they do express understanding    I have worn an N95 mask, gloves and eye protection for the entire encounter with this patient.     Differential diagnosis, natural history, supportive care, and indications for immediate follow-up discussed.

## 2024-02-12 ENCOUNTER — TELEMEDICINE (OUTPATIENT)
Dept: BEHAVIORAL HEALTH | Facility: CLINIC | Age: 49
End: 2024-02-12
Payer: COMMERCIAL

## 2024-02-12 DIAGNOSIS — Z86.59 HISTORY OF DEPRESSION: ICD-10-CM

## 2024-02-12 DIAGNOSIS — Z86.59 HISTORY OF ANXIETY: ICD-10-CM

## 2024-02-12 DIAGNOSIS — F90.2 ATTENTION DEFICIT HYPERACTIVITY DISORDER (ADHD), COMBINED TYPE: ICD-10-CM

## 2024-02-12 PROCEDURE — 99204 OFFICE O/P NEW MOD 45 MIN: CPT | Mod: GC,95 | Performed by: PSYCHIATRY & NEUROLOGY

## 2024-02-12 RX ORDER — DEXTROAMPHETAMINE SACCHARATE, AMPHETAMINE ASPARTATE MONOHYDRATE, DEXTROAMPHETAMINE SULFATE AND AMPHETAMINE SULFATE 2.5; 2.5; 2.5; 2.5 MG/1; MG/1; MG/1; MG/1
10 CAPSULE, EXTENDED RELEASE ORAL EVERY MORNING
Qty: 30 CAPSULE | Refills: 0 | Status: SHIPPED | OUTPATIENT
Start: 2024-02-12 | End: 2024-03-01 | Stop reason: SDUPTHER

## 2024-02-12 RX ORDER — VALACYCLOVIR HYDROCHLORIDE 1 G/1
1000 TABLET, FILM COATED ORAL 2 TIMES DAILY PRN
COMMUNITY
Start: 2023-11-22

## 2024-02-12 ASSESSMENT — PATIENT HEALTH QUESTIONNAIRE - PHQ9: CLINICAL INTERPRETATION OF PHQ2 SCORE: 0
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
## 2024-03-01 ENCOUNTER — TELEMEDICINE (OUTPATIENT)
Dept: BEHAVIORAL HEALTH | Facility: CLINIC | Age: 49
End: 2024-03-01
Payer: COMMERCIAL

## 2024-03-01 DIAGNOSIS — F90.2 ATTENTION DEFICIT HYPERACTIVITY DISORDER (ADHD), COMBINED TYPE: ICD-10-CM

## 2024-03-01 DIAGNOSIS — Z86.59 HISTORY OF ANXIETY: ICD-10-CM

## 2024-03-01 PROCEDURE — 99214 OFFICE O/P EST MOD 30 MIN: CPT | Mod: 95,GC | Performed by: PSYCHIATRY & NEUROLOGY

## 2024-03-01 RX ORDER — DEXTROAMPHETAMINE SACCHARATE, AMPHETAMINE ASPARTATE MONOHYDRATE, DEXTROAMPHETAMINE SULFATE AND AMPHETAMINE SULFATE 2.5; 2.5; 2.5; 2.5 MG/1; MG/1; MG/1; MG/1
10 CAPSULE, EXTENDED RELEASE ORAL EVERY MORNING
Qty: 30 CAPSULE | Refills: 0 | Status: SHIPPED | OUTPATIENT
Start: 2024-03-11 | End: 2024-04-10

## 2024-03-01 RX ORDER — DEXTROAMPHETAMINE SACCHARATE, AMPHETAMINE ASPARTATE, DEXTROAMPHETAMINE SULFATE AND AMPHETAMINE SULFATE 1.25; 1.25; 1.25; 1.25 MG/1; MG/1; MG/1; MG/1
5 TABLET ORAL DAILY
Qty: 30 TABLET | Refills: 0 | Status: SHIPPED | OUTPATIENT
Start: 2024-03-01 | End: 2024-03-31

## 2024-03-25 ENCOUNTER — APPOINTMENT (OUTPATIENT)
Dept: BEHAVIORAL HEALTH | Facility: CLINIC | Age: 49
End: 2024-03-25
Payer: COMMERCIAL

## 2024-04-08 ENCOUNTER — APPOINTMENT (OUTPATIENT)
Dept: BEHAVIORAL HEALTH | Facility: CLINIC | Age: 49
End: 2024-04-08
Payer: COMMERCIAL

## 2024-04-08 ENCOUNTER — TELEMEDICINE (OUTPATIENT)
Dept: BEHAVIORAL HEALTH | Facility: CLINIC | Age: 49
End: 2024-04-08
Payer: COMMERCIAL

## 2024-04-08 DIAGNOSIS — F90.2 ATTENTION DEFICIT HYPERACTIVITY DISORDER (ADHD), COMBINED TYPE: ICD-10-CM

## 2024-04-08 PROCEDURE — 99213 OFFICE O/P EST LOW 20 MIN: CPT | Mod: GC,95 | Performed by: PSYCHIATRY & NEUROLOGY

## 2024-04-08 RX ORDER — DEXTROAMPHETAMINE SACCHARATE, AMPHETAMINE ASPARTATE, DEXTROAMPHETAMINE SULFATE AND AMPHETAMINE SULFATE 1.25; 1.25; 1.25; 1.25 MG/1; MG/1; MG/1; MG/1
5 TABLET ORAL DAILY
Qty: 30 TABLET | Refills: 0 | Status: SHIPPED | OUTPATIENT
Start: 2024-06-07 | End: 2024-07-07

## 2024-04-08 RX ORDER — DEXTROAMPHETAMINE SACCHARATE, AMPHETAMINE ASPARTATE MONOHYDRATE, DEXTROAMPHETAMINE SULFATE AND AMPHETAMINE SULFATE 2.5; 2.5; 2.5; 2.5 MG/1; MG/1; MG/1; MG/1
10 CAPSULE, EXTENDED RELEASE ORAL EVERY MORNING
Qty: 30 CAPSULE | Refills: 0 | Status: SHIPPED | OUTPATIENT
Start: 2024-06-07 | End: 2024-07-07

## 2024-04-08 RX ORDER — DEXTROAMPHETAMINE SACCHARATE, AMPHETAMINE ASPARTATE, DEXTROAMPHETAMINE SULFATE AND AMPHETAMINE SULFATE 1.25; 1.25; 1.25; 1.25 MG/1; MG/1; MG/1; MG/1
5 TABLET ORAL DAILY
Qty: 30 TABLET | Refills: 0 | Status: SHIPPED | OUTPATIENT
Start: 2024-04-08 | End: 2024-05-08

## 2024-04-08 RX ORDER — DEXTROAMPHETAMINE SACCHARATE, AMPHETAMINE ASPARTATE MONOHYDRATE, DEXTROAMPHETAMINE SULFATE AND AMPHETAMINE SULFATE 2.5; 2.5; 2.5; 2.5 MG/1; MG/1; MG/1; MG/1
10 CAPSULE, EXTENDED RELEASE ORAL EVERY MORNING
Qty: 30 CAPSULE | Refills: 0 | Status: SHIPPED | OUTPATIENT
Start: 2024-05-08 | End: 2024-06-07

## 2024-04-08 RX ORDER — DEXTROAMPHETAMINE SACCHARATE, AMPHETAMINE ASPARTATE, DEXTROAMPHETAMINE SULFATE AND AMPHETAMINE SULFATE 1.25; 1.25; 1.25; 1.25 MG/1; MG/1; MG/1; MG/1
5 TABLET ORAL DAILY
Qty: 30 TABLET | Refills: 0 | Status: SHIPPED | OUTPATIENT
Start: 2024-05-08 | End: 2024-06-07

## 2024-04-08 RX ORDER — DEXTROAMPHETAMINE SACCHARATE, AMPHETAMINE ASPARTATE MONOHYDRATE, DEXTROAMPHETAMINE SULFATE AND AMPHETAMINE SULFATE 2.5; 2.5; 2.5; 2.5 MG/1; MG/1; MG/1; MG/1
10 CAPSULE, EXTENDED RELEASE ORAL EVERY MORNING
Qty: 30 CAPSULE | Refills: 0 | Status: SHIPPED | OUTPATIENT
Start: 2024-04-08 | End: 2024-05-08
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
## 2024-07-12 DIAGNOSIS — F90.2 ATTENTION DEFICIT HYPERACTIVITY DISORDER (ADHD), COMBINED TYPE: ICD-10-CM

## 2024-07-12 RX ORDER — DEXTROAMPHETAMINE SACCHARATE, AMPHETAMINE ASPARTATE, DEXTROAMPHETAMINE SULFATE AND AMPHETAMINE SULFATE 1.25; 1.25; 1.25; 1.25 MG/1; MG/1; MG/1; MG/1
5 TABLET ORAL DAILY
Qty: 30 TABLET | Refills: 0 | Status: SHIPPED | OUTPATIENT
Start: 2024-07-12 | End: 2024-08-11

## 2024-07-12 RX ORDER — DEXTROAMPHETAMINE SACCHARATE, AMPHETAMINE ASPARTATE MONOHYDRATE, DEXTROAMPHETAMINE SULFATE AND AMPHETAMINE SULFATE 2.5; 2.5; 2.5; 2.5 MG/1; MG/1; MG/1; MG/1
10 CAPSULE, EXTENDED RELEASE ORAL EVERY MORNING
Qty: 30 CAPSULE | Refills: 0 | Status: SHIPPED | OUTPATIENT
Start: 2024-07-12 | End: 2024-08-11

## 2024-07-15 ENCOUNTER — APPOINTMENT (OUTPATIENT)
Dept: OBGYN | Facility: CLINIC | Age: 49
End: 2024-07-15
Payer: COMMERCIAL

## 2024-08-14 ENCOUNTER — TELEMEDICINE (OUTPATIENT)
Dept: BEHAVIORAL HEALTH | Facility: CLINIC | Age: 49
End: 2024-08-14
Payer: COMMERCIAL

## 2024-08-14 DIAGNOSIS — F90.2 ATTENTION DEFICIT HYPERACTIVITY DISORDER (ADHD), COMBINED TYPE: ICD-10-CM

## 2024-08-14 PROCEDURE — 99214 OFFICE O/P EST MOD 30 MIN: CPT | Mod: GC,95

## 2024-08-14 RX ORDER — DEXTROAMPHETAMINE SACCHARATE, AMPHETAMINE ASPARTATE, DEXTROAMPHETAMINE SULFATE AND AMPHETAMINE SULFATE 1.25; 1.25; 1.25; 1.25 MG/1; MG/1; MG/1; MG/1
5 TABLET ORAL EVERY MORNING
Qty: 30 TABLET | Refills: 0 | Status: SHIPPED | OUTPATIENT
Start: 2024-08-14 | End: 2024-09-13

## 2024-08-14 RX ORDER — DEXTROAMPHETAMINE SACCHARATE, AMPHETAMINE ASPARTATE, DEXTROAMPHETAMINE SULFATE AND AMPHETAMINE SULFATE 1.25; 1.25; 1.25; 1.25 MG/1; MG/1; MG/1; MG/1
5 TABLET ORAL EVERY MORNING
Qty: 30 TABLET | Refills: 0 | Status: SHIPPED | OUTPATIENT
Start: 2024-09-13 | End: 2024-10-13

## 2024-08-14 RX ORDER — DEXTROAMPHETAMINE SACCHARATE, AMPHETAMINE ASPARTATE MONOHYDRATE, DEXTROAMPHETAMINE SULFATE AND AMPHETAMINE SULFATE 5; 5; 5; 5 MG/1; MG/1; MG/1; MG/1
20 CAPSULE, EXTENDED RELEASE ORAL EVERY MORNING
Qty: 30 CAPSULE | Refills: 0 | Status: SHIPPED | OUTPATIENT
Start: 2024-08-14 | End: 2024-09-13

## 2024-08-14 RX ORDER — DEXTROAMPHETAMINE SACCHARATE, AMPHETAMINE ASPARTATE MONOHYDRATE, DEXTROAMPHETAMINE SULFATE AND AMPHETAMINE SULFATE 5; 5; 5; 5 MG/1; MG/1; MG/1; MG/1
20 CAPSULE, EXTENDED RELEASE ORAL EVERY MORNING
Qty: 30 CAPSULE | Refills: 0 | Status: SHIPPED | OUTPATIENT
Start: 2024-09-13 | End: 2024-10-13

## 2024-08-14 ASSESSMENT — ENCOUNTER SYMPTOMS
CARDIOVASCULAR NEGATIVE: 1
RESPIRATORY NEGATIVE: 1
CONSTITUTIONAL NEGATIVE: 1
SHORTNESS OF BREATH: 0
TINGLING: 0
HEADACHES: 0
NAUSEA: 0
GASTROINTESTINAL NEGATIVE: 1
CONSTIPATION: 0
DIARRHEA: 0
NEUROLOGICAL NEGATIVE: 1
VOMITING: 0
WEAKNESS: 0
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
## 2024-08-14 NOTE — ASSESSMENT & PLAN NOTE
Problem type: Chronic Illness with exacerbation, progression, or side effects of treatment    Assessment: 49 year old female with hx of post-partum depression and ADHD combined type who presents virtually for transfer of care. Since last appointment, patient reports self-titration of medications (doubled doses of XR and IR) and that she subsequently ran out 2 weeks early. Patient reports current uncontrolled inattentive and hyperactive sxs leading to worsening anxiety and increased alcohol use (current daily use of 3 drinks). Patient wishes to cut back/quit alcohol and restart her psychotropic medications. Plan to restart Adderall XR 20mg (previously stabilized on Adderall IR 20mg by Dr. Deutsch per patient & PDMP and will attempt to obtain these records for psychological assessment). Will follow up in 1 mo increments until stabilization is achieved.    Plan  Medication:   -RESTART Adderall XR 20mg PO daily  -RESTART Adderall IR 5mg PO daily    Therapy:   -Consider psychotherapy and other behavioral modifications for adult ADHD    Other Orders: none

## 2024-09-16 ENCOUNTER — APPOINTMENT (OUTPATIENT)
Dept: BEHAVIORAL HEALTH | Facility: CLINIC | Age: 49
End: 2024-09-16
Payer: COMMERCIAL

## 2024-09-16 DIAGNOSIS — F90.2 ATTENTION DEFICIT HYPERACTIVITY DISORDER (ADHD), COMBINED TYPE: ICD-10-CM

## 2024-09-16 RX ORDER — DEXTROAMPHETAMINE SACCHARATE, AMPHETAMINE ASPARTATE MONOHYDRATE, DEXTROAMPHETAMINE SULFATE AND AMPHETAMINE SULFATE 5; 5; 5; 5 MG/1; MG/1; MG/1; MG/1
20 CAPSULE, EXTENDED RELEASE ORAL EVERY MORNING
Qty: 30 CAPSULE | Refills: 0 | OUTPATIENT
Start: 2024-09-16 | End: 2024-10-16

## 2024-09-16 RX ORDER — DEXTROAMPHETAMINE SACCHARATE, AMPHETAMINE ASPARTATE, DEXTROAMPHETAMINE SULFATE AND AMPHETAMINE SULFATE 1.25; 1.25; 1.25; 1.25 MG/1; MG/1; MG/1; MG/1
5 TABLET ORAL EVERY MORNING
Qty: 30 TABLET | Refills: 0 | OUTPATIENT
Start: 2024-09-16 | End: 2024-10-16

## 2024-09-16 NOTE — TELEPHONE ENCOUNTER
Asif pt was RS on 10/7/24. Pt is out of meds. Can you please send in a refill for her?    Received request via: Patient    Was the patient seen in the last year in this department? Yes    Does the patient have an active prescription (recently filled or refills available) for medication(s) requested? No    Pharmacy Name: [facility]    Does the patient have long-term Plus and need 100-day supply? (This applies to ALL medications) Patient does not have SCP

## 2024-10-09 DIAGNOSIS — F90.2 ATTENTION DEFICIT HYPERACTIVITY DISORDER (ADHD), COMBINED TYPE: ICD-10-CM

## 2024-10-09 RX ORDER — DEXTROAMPHETAMINE SACCHARATE, AMPHETAMINE ASPARTATE MONOHYDRATE, DEXTROAMPHETAMINE SULFATE AND AMPHETAMINE SULFATE 5; 5; 5; 5 MG/1; MG/1; MG/1; MG/1
20 CAPSULE, EXTENDED RELEASE ORAL EVERY MORNING
Qty: 30 CAPSULE | Refills: 0 | Status: SHIPPED | OUTPATIENT
Start: 2024-10-15 | End: 2024-11-14

## 2024-10-09 RX ORDER — DEXTROAMPHETAMINE SACCHARATE, AMPHETAMINE ASPARTATE, DEXTROAMPHETAMINE SULFATE AND AMPHETAMINE SULFATE 1.25; 1.25; 1.25; 1.25 MG/1; MG/1; MG/1; MG/1
5 TABLET ORAL EVERY MORNING
Qty: 30 TABLET | Refills: 0 | Status: SHIPPED | OUTPATIENT
Start: 2024-10-15 | End: 2024-11-14